# Patient Record
Sex: MALE | Race: WHITE | NOT HISPANIC OR LATINO | Employment: UNEMPLOYED | URBAN - METROPOLITAN AREA
[De-identification: names, ages, dates, MRNs, and addresses within clinical notes are randomized per-mention and may not be internally consistent; named-entity substitution may affect disease eponyms.]

---

## 2017-06-06 ENCOUNTER — TRANSCRIBE ORDERS (OUTPATIENT)
Dept: PHYSICAL THERAPY | Facility: CLINIC | Age: 47
End: 2017-06-06

## 2017-06-06 DIAGNOSIS — S76.012A STRAIN OF LEFT BUTTOCK, INITIAL ENCOUNTER: Primary | ICD-10-CM

## 2018-06-15 ENCOUNTER — HOSPITAL ENCOUNTER (OUTPATIENT)
Facility: HOSPITAL | Age: 48
Setting detail: OBSERVATION
LOS: 1 days | Discharge: HOME | End: 2018-06-16
Attending: EMERGENCY MEDICINE | Admitting: SURGERY

## 2018-06-15 ENCOUNTER — APPOINTMENT (OUTPATIENT)
Dept: GENERAL RADIOLOGY | Facility: HOSPITAL | Age: 48
End: 2018-06-15

## 2018-06-15 ENCOUNTER — APPOINTMENT (OUTPATIENT)
Dept: CT IMAGING | Facility: HOSPITAL | Age: 48
End: 2018-06-15

## 2018-06-15 DIAGNOSIS — K80.00 ACUTE CHOLECYSTITIS DUE TO BILIARY CALCULUS: Primary | ICD-10-CM

## 2018-06-15 DIAGNOSIS — K81.9 CHOLECYSTITIS: ICD-10-CM

## 2018-06-15 LAB
ALBUMIN SERPL-MCNC: 4.2 G/DL (ref 3.5–5.2)
ALBUMIN/GLOB SERPL: 1.3 G/DL
ALP SERPL-CCNC: 101 U/L (ref 39–117)
ALT SERPL W P-5'-P-CCNC: 35 U/L (ref 1–41)
ANION GAP SERPL CALCULATED.3IONS-SCNC: 16.5 MMOL/L
AST SERPL-CCNC: 26 U/L (ref 1–40)
BASOPHILS # BLD AUTO: 0.05 10*3/MM3 (ref 0–0.2)
BASOPHILS NFR BLD AUTO: 0.4 % (ref 0–1.5)
BILIRUB SERPL-MCNC: 0.2 MG/DL (ref 0.1–1.2)
BUN BLD-MCNC: 15 MG/DL (ref 6–20)
BUN/CREAT SERPL: 14.4 (ref 7–25)
CALCIUM SPEC-SCNC: 9.5 MG/DL (ref 8.6–10.5)
CHLORIDE SERPL-SCNC: 103 MMOL/L (ref 98–107)
CO2 SERPL-SCNC: 21.5 MMOL/L (ref 22–29)
CREAT BLD-MCNC: 1.04 MG/DL (ref 0.76–1.27)
DEPRECATED RDW RBC AUTO: 44.8 FL (ref 37–54)
EOSINOPHIL # BLD AUTO: 0.34 10*3/MM3 (ref 0–0.7)
EOSINOPHIL NFR BLD AUTO: 3 % (ref 0.3–6.2)
ERYTHROCYTE [DISTWIDTH] IN BLOOD BY AUTOMATED COUNT: 12.8 % (ref 11.5–14.5)
GFR SERPL CREATININE-BSD FRML MDRD: 77 ML/MIN/1.73
GLOBULIN UR ELPH-MCNC: 3.2 GM/DL
GLUCOSE BLD-MCNC: 135 MG/DL (ref 65–99)
HCT VFR BLD AUTO: 45.5 % (ref 40.4–52.2)
HGB BLD-MCNC: 15.5 G/DL (ref 13.7–17.6)
IMM GRANULOCYTES # BLD: 0.03 10*3/MM3 (ref 0–0.03)
IMM GRANULOCYTES NFR BLD: 0.3 % (ref 0–0.5)
LYMPHOCYTES # BLD AUTO: 2.59 10*3/MM3 (ref 0.9–4.8)
LYMPHOCYTES NFR BLD AUTO: 23 % (ref 19.6–45.3)
MCH RBC QN AUTO: 32.7 PG (ref 27–32.7)
MCHC RBC AUTO-ENTMCNC: 34.1 G/DL (ref 32.6–36.4)
MCV RBC AUTO: 96 FL (ref 79.8–96.2)
MONOCYTES # BLD AUTO: 0.93 10*3/MM3 (ref 0.2–1.2)
MONOCYTES NFR BLD AUTO: 8.2 % (ref 5–12)
NEUTROPHILS # BLD AUTO: 7.34 10*3/MM3 (ref 1.9–8.1)
NEUTROPHILS NFR BLD AUTO: 65.1 % (ref 42.7–76)
PLATELET # BLD AUTO: 265 10*3/MM3 (ref 140–500)
PMV BLD AUTO: 10.2 FL (ref 6–12)
POTASSIUM BLD-SCNC: 3.9 MMOL/L (ref 3.5–5.2)
PROT SERPL-MCNC: 7.4 G/DL (ref 6–8.5)
RBC # BLD AUTO: 4.74 10*6/MM3 (ref 4.6–6)
SODIUM BLD-SCNC: 141 MMOL/L (ref 136–145)
TROPONIN T SERPL-MCNC: <0.01 NG/ML (ref 0–0.03)
WBC NRBC COR # BLD: 11.28 10*3/MM3 (ref 4.5–10.7)

## 2018-06-15 PROCEDURE — 25010000002 HYDROMORPHONE PER 4 MG: Performed by: EMERGENCY MEDICINE

## 2018-06-15 PROCEDURE — G0378 HOSPITAL OBSERVATION PER HR: HCPCS

## 2018-06-15 PROCEDURE — 93005 ELECTROCARDIOGRAM TRACING: CPT | Performed by: EMERGENCY MEDICINE

## 2018-06-15 PROCEDURE — 80053 COMPREHEN METABOLIC PANEL: CPT | Performed by: EMERGENCY MEDICINE

## 2018-06-15 PROCEDURE — 93005 ELECTROCARDIOGRAM TRACING: CPT

## 2018-06-15 PROCEDURE — 85025 COMPLETE CBC W/AUTO DIFF WBC: CPT | Performed by: EMERGENCY MEDICINE

## 2018-06-15 PROCEDURE — 96374 THER/PROPH/DIAG INJ IV PUSH: CPT

## 2018-06-15 PROCEDURE — 99284 EMERGENCY DEPT VISIT MOD MDM: CPT

## 2018-06-15 PROCEDURE — 84484 ASSAY OF TROPONIN QUANT: CPT | Performed by: EMERGENCY MEDICINE

## 2018-06-15 PROCEDURE — 96376 TX/PRO/DX INJ SAME DRUG ADON: CPT

## 2018-06-15 PROCEDURE — 25010000002 MORPHINE PER 10 MG: Performed by: EMERGENCY MEDICINE

## 2018-06-15 PROCEDURE — 96375 TX/PRO/DX INJ NEW DRUG ADDON: CPT

## 2018-06-15 PROCEDURE — 93010 ELECTROCARDIOGRAM REPORT: CPT | Performed by: INTERNAL MEDICINE

## 2018-06-15 PROCEDURE — 25010000002 ONDANSETRON PER 1 MG: Performed by: EMERGENCY MEDICINE

## 2018-06-15 PROCEDURE — 71275 CT ANGIOGRAPHY CHEST: CPT

## 2018-06-15 PROCEDURE — 83690 ASSAY OF LIPASE: CPT | Performed by: PHYSICIAN ASSISTANT

## 2018-06-15 PROCEDURE — 71046 X-RAY EXAM CHEST 2 VIEWS: CPT

## 2018-06-15 RX ORDER — ONDANSETRON 2 MG/ML
4 INJECTION INTRAMUSCULAR; INTRAVENOUS ONCE
Status: COMPLETED | OUTPATIENT
Start: 2018-06-15 | End: 2018-06-15

## 2018-06-15 RX ORDER — SODIUM CHLORIDE 0.9 % (FLUSH) 0.9 %
10 SYRINGE (ML) INJECTION AS NEEDED
Status: DISCONTINUED | OUTPATIENT
Start: 2018-06-15 | End: 2018-06-16 | Stop reason: HOSPADM

## 2018-06-15 RX ORDER — NITROGLYCERIN 0.4 MG/1
0.4 TABLET SUBLINGUAL
Status: DISCONTINUED | OUTPATIENT
Start: 2018-06-15 | End: 2018-06-16 | Stop reason: HOSPADM

## 2018-06-15 RX ORDER — ALUMINA, MAGNESIA, AND SIMETHICONE 2400; 2400; 240 MG/30ML; MG/30ML; MG/30ML
15 SUSPENSION ORAL ONCE
Status: COMPLETED | OUTPATIENT
Start: 2018-06-15 | End: 2018-06-15

## 2018-06-15 RX ORDER — ASPIRIN 325 MG
325 TABLET ORAL ONCE
Status: COMPLETED | OUTPATIENT
Start: 2018-06-15 | End: 2018-06-15

## 2018-06-15 RX ORDER — PAROXETINE 30 MG/1
30 TABLET, FILM COATED ORAL EVERY MORNING
COMMUNITY

## 2018-06-15 RX ADMIN — ASPIRIN 325 MG: 325 TABLET ORAL at 23:12

## 2018-06-15 RX ADMIN — IOPAMIDOL 95 ML: 755 INJECTION, SOLUTION INTRAVENOUS at 23:58

## 2018-06-15 RX ADMIN — ONDANSETRON 4 MG: 2 INJECTION, SOLUTION INTRAMUSCULAR; INTRAVENOUS at 23:08

## 2018-06-15 RX ADMIN — NITROGLYCERIN 0.4 MG: 0.4 TABLET SUBLINGUAL at 23:13

## 2018-06-15 RX ADMIN — LIDOCAINE HYDROCHLORIDE 15 ML: 20 SOLUTION ORAL; TOPICAL at 23:50

## 2018-06-15 RX ADMIN — MORPHINE SULFATE 4 MG: 4 INJECTION INTRAVENOUS at 23:09

## 2018-06-15 RX ADMIN — MORPHINE SULFATE 4 MG: 4 INJECTION INTRAVENOUS at 23:43

## 2018-06-15 RX ADMIN — HYDROMORPHONE HYDROCHLORIDE 1 MG: 1 INJECTION, SOLUTION INTRAMUSCULAR; INTRAVENOUS; SUBCUTANEOUS at 23:51

## 2018-06-15 RX ADMIN — ALUMINUM HYDROXIDE, MAGNESIUM HYDROXIDE, AND DIMETHICONE 15 ML: 400; 400; 40 SUSPENSION ORAL at 23:51

## 2018-06-16 ENCOUNTER — APPOINTMENT (OUTPATIENT)
Dept: CT IMAGING | Facility: HOSPITAL | Age: 48
End: 2018-06-16

## 2018-06-16 ENCOUNTER — ANESTHESIA (OUTPATIENT)
Dept: PERIOP | Facility: HOSPITAL | Age: 48
End: 2018-06-16

## 2018-06-16 ENCOUNTER — ANESTHESIA EVENT (OUTPATIENT)
Dept: PERIOP | Facility: HOSPITAL | Age: 48
End: 2018-06-16

## 2018-06-16 VITALS
RESPIRATION RATE: 16 BRPM | WEIGHT: 260.3 LBS | TEMPERATURE: 98.3 F | HEIGHT: 71 IN | SYSTOLIC BLOOD PRESSURE: 119 MMHG | BODY MASS INDEX: 36.44 KG/M2 | OXYGEN SATURATION: 94 % | HEART RATE: 81 BPM | DIASTOLIC BLOOD PRESSURE: 72 MMHG

## 2018-06-16 PROBLEM — K80.00 ACUTE CHOLECYSTITIS DUE TO BILIARY CALCULUS: Status: RESOLVED | Noted: 2018-06-16 | Resolved: 2018-06-16

## 2018-06-16 PROBLEM — K80.00 ACUTE CHOLECYSTITIS DUE TO BILIARY CALCULUS: Status: ACTIVE | Noted: 2018-06-16

## 2018-06-16 LAB
HOLD SPECIMEN: NORMAL
HOLD SPECIMEN: NORMAL
LIPASE SERPL-CCNC: 31 U/L (ref 13–60)
WHOLE BLOOD HOLD SPECIMEN: NORMAL
WHOLE BLOOD HOLD SPECIMEN: NORMAL

## 2018-06-16 PROCEDURE — G0378 HOSPITAL OBSERVATION PER HR: HCPCS

## 2018-06-16 PROCEDURE — 0 IOPAMIDOL PER 1 ML: Performed by: EMERGENCY MEDICINE

## 2018-06-16 PROCEDURE — 25010000002 FENTANYL CITRATE (PF) 100 MCG/2ML SOLUTION: Performed by: NURSE ANESTHETIST, CERTIFIED REGISTERED

## 2018-06-16 PROCEDURE — 25010000002 METOCLOPRAMIDE PER 10 MG: Performed by: SURGERY

## 2018-06-16 PROCEDURE — 88304 TISSUE EXAM BY PATHOLOGIST: CPT | Performed by: SURGERY

## 2018-06-16 PROCEDURE — 25010000002 PROPOFOL 10 MG/ML EMULSION: Performed by: NURSE ANESTHETIST, CERTIFIED REGISTERED

## 2018-06-16 PROCEDURE — 25010000002 MIDAZOLAM PER 1 MG: Performed by: ANESTHESIOLOGY

## 2018-06-16 PROCEDURE — 25010000002 METOCLOPRAMIDE PER 10 MG

## 2018-06-16 PROCEDURE — 74176 CT ABD & PELVIS W/O CONTRAST: CPT

## 2018-06-16 PROCEDURE — 25010000002 HYDROMORPHONE PER 4 MG: Performed by: NURSE ANESTHETIST, CERTIFIED REGISTERED

## 2018-06-16 PROCEDURE — 25010000002 ONDANSETRON PER 1 MG: Performed by: NURSE ANESTHETIST, CERTIFIED REGISTERED

## 2018-06-16 PROCEDURE — 25010000002 DEXAMETHASONE PER 1 MG: Performed by: NURSE ANESTHETIST, CERTIFIED REGISTERED

## 2018-06-16 PROCEDURE — 25010000002 KETOROLAC TROMETHAMINE PER 15 MG: Performed by: NURSE ANESTHETIST, CERTIFIED REGISTERED

## 2018-06-16 PROCEDURE — 25010000002 PIPERACILLIN SOD-TAZOBACTAM PER 1 G: Performed by: SURGERY

## 2018-06-16 PROCEDURE — 25010000002 PIPERACILLIN SOD-TAZOBACTAM PER 1 G: Performed by: PHYSICIAN ASSISTANT

## 2018-06-16 RX ORDER — FENTANYL CITRATE 50 UG/ML
50 INJECTION, SOLUTION INTRAMUSCULAR; INTRAVENOUS
Status: DISCONTINUED | OUTPATIENT
Start: 2018-06-16 | End: 2018-06-16 | Stop reason: HOSPADM

## 2018-06-16 RX ORDER — OXYCODONE HYDROCHLORIDE AND ACETAMINOPHEN 5; 325 MG/1; MG/1
1 TABLET ORAL EVERY 4 HOURS PRN
Status: DISCONTINUED | OUTPATIENT
Start: 2018-06-16 | End: 2018-06-16 | Stop reason: HOSPADM

## 2018-06-16 RX ORDER — OXYCODONE HYDROCHLORIDE AND ACETAMINOPHEN 5; 325 MG/1; MG/1
2 TABLET ORAL EVERY 4 HOURS PRN
Status: DISCONTINUED | OUTPATIENT
Start: 2018-06-16 | End: 2018-06-16 | Stop reason: HOSPADM

## 2018-06-16 RX ORDER — ONDANSETRON 2 MG/ML
INJECTION INTRAMUSCULAR; INTRAVENOUS AS NEEDED
Status: DISCONTINUED | OUTPATIENT
Start: 2018-06-16 | End: 2018-06-16 | Stop reason: SURG

## 2018-06-16 RX ORDER — SODIUM CHLORIDE, SODIUM LACTATE, POTASSIUM CHLORIDE, CALCIUM CHLORIDE 600; 310; 30; 20 MG/100ML; MG/100ML; MG/100ML; MG/100ML
9 INJECTION, SOLUTION INTRAVENOUS CONTINUOUS
Status: DISCONTINUED | OUTPATIENT
Start: 2018-06-16 | End: 2018-06-16 | Stop reason: HOSPADM

## 2018-06-16 RX ORDER — FENTANYL CITRATE 50 UG/ML
50 INJECTION, SOLUTION INTRAMUSCULAR; INTRAVENOUS
Status: DISCONTINUED | OUTPATIENT
Start: 2018-06-16 | End: 2018-06-16

## 2018-06-16 RX ORDER — HYDROXYZINE PAMOATE 50 MG/1
50 CAPSULE ORAL EVERY 6 HOURS PRN
Status: DISCONTINUED | OUTPATIENT
Start: 2018-06-16 | End: 2018-06-16 | Stop reason: HOSPADM

## 2018-06-16 RX ORDER — METOCLOPRAMIDE HYDROCHLORIDE 5 MG/ML
INJECTION INTRAMUSCULAR; INTRAVENOUS
Status: COMPLETED
Start: 2018-06-16 | End: 2018-06-16

## 2018-06-16 RX ORDER — DEXAMETHASONE SODIUM PHOSPHATE 10 MG/ML
INJECTION INTRAMUSCULAR; INTRAVENOUS AS NEEDED
Status: DISCONTINUED | OUTPATIENT
Start: 2018-06-16 | End: 2018-06-16 | Stop reason: SURG

## 2018-06-16 RX ORDER — PROMETHAZINE HYDROCHLORIDE 25 MG/ML
12.5 INJECTION, SOLUTION INTRAMUSCULAR; INTRAVENOUS ONCE AS NEEDED
Status: DISCONTINUED | OUTPATIENT
Start: 2018-06-16 | End: 2018-06-16

## 2018-06-16 RX ORDER — FLUMAZENIL 0.1 MG/ML
0.2 INJECTION INTRAVENOUS AS NEEDED
Status: DISCONTINUED | OUTPATIENT
Start: 2018-06-16 | End: 2018-06-16

## 2018-06-16 RX ORDER — BUPIVACAINE HYDROCHLORIDE AND EPINEPHRINE 2.5; 5 MG/ML; UG/ML
INJECTION, SOLUTION INFILTRATION; PERINEURAL AS NEEDED
Status: DISCONTINUED | OUTPATIENT
Start: 2018-06-16 | End: 2018-06-16 | Stop reason: HOSPADM

## 2018-06-16 RX ORDER — FENTANYL CITRATE 50 UG/ML
INJECTION, SOLUTION INTRAMUSCULAR; INTRAVENOUS
Status: COMPLETED
Start: 2018-06-16 | End: 2018-06-16

## 2018-06-16 RX ORDER — ROCURONIUM BROMIDE 10 MG/ML
INJECTION, SOLUTION INTRAVENOUS AS NEEDED
Status: DISCONTINUED | OUTPATIENT
Start: 2018-06-16 | End: 2018-06-16 | Stop reason: SURG

## 2018-06-16 RX ORDER — KETOROLAC TROMETHAMINE 30 MG/ML
INJECTION, SOLUTION INTRAMUSCULAR; INTRAVENOUS AS NEEDED
Status: DISCONTINUED | OUTPATIENT
Start: 2018-06-16 | End: 2018-06-16 | Stop reason: SURG

## 2018-06-16 RX ORDER — EPHEDRINE SULFATE 50 MG/ML
5 INJECTION, SOLUTION INTRAVENOUS ONCE AS NEEDED
Status: DISCONTINUED | OUTPATIENT
Start: 2018-06-16 | End: 2018-06-16

## 2018-06-16 RX ORDER — SODIUM CHLORIDE 0.9 % (FLUSH) 0.9 %
1-10 SYRINGE (ML) INJECTION AS NEEDED
Status: DISCONTINUED | OUTPATIENT
Start: 2018-06-16 | End: 2018-06-16 | Stop reason: HOSPADM

## 2018-06-16 RX ORDER — LIDOCAINE HYDROCHLORIDE 20 MG/ML
INJECTION, SOLUTION INFILTRATION; PERINEURAL AS NEEDED
Status: DISCONTINUED | OUTPATIENT
Start: 2018-06-16 | End: 2018-06-16 | Stop reason: SURG

## 2018-06-16 RX ORDER — ONDANSETRON 2 MG/ML
4 INJECTION INTRAMUSCULAR; INTRAVENOUS ONCE AS NEEDED
Status: DISCONTINUED | OUTPATIENT
Start: 2018-06-16 | End: 2018-06-16

## 2018-06-16 RX ORDER — OXYCODONE HYDROCHLORIDE AND ACETAMINOPHEN 5; 325 MG/1; MG/1
1-2 TABLET ORAL EVERY 4 HOURS PRN
Qty: 30 TABLET | Refills: 0 | Status: SHIPPED | OUTPATIENT
Start: 2018-06-16

## 2018-06-16 RX ORDER — MIDAZOLAM HYDROCHLORIDE 1 MG/ML
1 INJECTION INTRAMUSCULAR; INTRAVENOUS
Status: DISCONTINUED | OUTPATIENT
Start: 2018-06-16 | End: 2018-06-16 | Stop reason: HOSPADM

## 2018-06-16 RX ORDER — OXYCODONE HYDROCHLORIDE AND ACETAMINOPHEN 5; 325 MG/1; MG/1
2 TABLET ORAL ONCE AS NEEDED
Status: DISCONTINUED | OUTPATIENT
Start: 2018-06-16 | End: 2018-06-16 | Stop reason: HOSPADM

## 2018-06-16 RX ORDER — LABETALOL HYDROCHLORIDE 5 MG/ML
5 INJECTION, SOLUTION INTRAVENOUS
Status: DISCONTINUED | OUTPATIENT
Start: 2018-06-16 | End: 2018-06-16

## 2018-06-16 RX ORDER — FAMOTIDINE 10 MG/ML
20 INJECTION, SOLUTION INTRAVENOUS ONCE
Status: COMPLETED | OUTPATIENT
Start: 2018-06-16 | End: 2018-06-16

## 2018-06-16 RX ORDER — PAROXETINE 30 MG/1
30 TABLET, FILM COATED ORAL EVERY MORNING
Status: DISCONTINUED | OUTPATIENT
Start: 2018-06-16 | End: 2018-06-16 | Stop reason: HOSPADM

## 2018-06-16 RX ORDER — NALOXONE HCL 0.4 MG/ML
0.2 VIAL (ML) INJECTION AS NEEDED
Status: DISCONTINUED | OUTPATIENT
Start: 2018-06-16 | End: 2018-06-16

## 2018-06-16 RX ORDER — PROPOFOL 10 MG/ML
VIAL (ML) INTRAVENOUS AS NEEDED
Status: DISCONTINUED | OUTPATIENT
Start: 2018-06-16 | End: 2018-06-16 | Stop reason: SURG

## 2018-06-16 RX ORDER — PROMETHAZINE HYDROCHLORIDE 25 MG/1
12.5 TABLET ORAL ONCE AS NEEDED
Status: DISCONTINUED | OUTPATIENT
Start: 2018-06-16 | End: 2018-06-16

## 2018-06-16 RX ORDER — SODIUM CHLORIDE, SODIUM LACTATE, POTASSIUM CHLORIDE, CALCIUM CHLORIDE 600; 310; 30; 20 MG/100ML; MG/100ML; MG/100ML; MG/100ML
150 INJECTION, SOLUTION INTRAVENOUS CONTINUOUS
Status: DISCONTINUED | OUTPATIENT
Start: 2018-06-16 | End: 2018-06-16

## 2018-06-16 RX ORDER — FENTANYL CITRATE 50 UG/ML
INJECTION, SOLUTION INTRAMUSCULAR; INTRAVENOUS AS NEEDED
Status: DISCONTINUED | OUTPATIENT
Start: 2018-06-16 | End: 2018-06-16 | Stop reason: SURG

## 2018-06-16 RX ORDER — LIDOCAINE HYDROCHLORIDE 10 MG/ML
0.5 INJECTION, SOLUTION EPIDURAL; INFILTRATION; INTRACAUDAL; PERINEURAL ONCE AS NEEDED
Status: DISCONTINUED | OUTPATIENT
Start: 2018-06-16 | End: 2018-06-16 | Stop reason: HOSPADM

## 2018-06-16 RX ORDER — PROMETHAZINE HYDROCHLORIDE 25 MG/1
25 TABLET ORAL ONCE AS NEEDED
Status: DISCONTINUED | OUTPATIENT
Start: 2018-06-16 | End: 2018-06-16

## 2018-06-16 RX ORDER — PROMETHAZINE HYDROCHLORIDE 25 MG/1
25 SUPPOSITORY RECTAL ONCE AS NEEDED
Status: DISCONTINUED | OUTPATIENT
Start: 2018-06-16 | End: 2018-06-16

## 2018-06-16 RX ORDER — SODIUM CHLORIDE, SODIUM LACTATE, POTASSIUM CHLORIDE, CALCIUM CHLORIDE 600; 310; 30; 20 MG/100ML; MG/100ML; MG/100ML; MG/100ML
75 INJECTION, SOLUTION INTRAVENOUS CONTINUOUS
Status: DISCONTINUED | OUTPATIENT
Start: 2018-06-16 | End: 2018-06-16 | Stop reason: HOSPADM

## 2018-06-16 RX ORDER — MIDAZOLAM HYDROCHLORIDE 1 MG/ML
2 INJECTION INTRAMUSCULAR; INTRAVENOUS
Status: DISCONTINUED | OUTPATIENT
Start: 2018-06-16 | End: 2018-06-16 | Stop reason: HOSPADM

## 2018-06-16 RX ORDER — DIPHENHYDRAMINE HYDROCHLORIDE 50 MG/ML
12.5 INJECTION INTRAMUSCULAR; INTRAVENOUS
Status: DISCONTINUED | OUTPATIENT
Start: 2018-06-16 | End: 2018-06-16

## 2018-06-16 RX ORDER — METOCLOPRAMIDE HYDROCHLORIDE 5 MG/ML
10 INJECTION INTRAMUSCULAR; INTRAVENOUS ONCE
Status: COMPLETED | OUTPATIENT
Start: 2018-06-16 | End: 2018-06-16

## 2018-06-16 RX ORDER — SODIUM CHLORIDE 9 MG/ML
INJECTION, SOLUTION INTRAVENOUS AS NEEDED
Status: DISCONTINUED | OUTPATIENT
Start: 2018-06-16 | End: 2018-06-16 | Stop reason: HOSPADM

## 2018-06-16 RX ORDER — PROMETHAZINE HYDROCHLORIDE 25 MG/ML
12.5 INJECTION, SOLUTION INTRAMUSCULAR; INTRAVENOUS EVERY 4 HOURS PRN
Status: DISCONTINUED | OUTPATIENT
Start: 2018-06-16 | End: 2018-06-16 | Stop reason: HOSPADM

## 2018-06-16 RX ADMIN — TAZOBACTAM SODIUM AND PIPERACILLIN SODIUM 4.5 G: 500; 4 INJECTION, SOLUTION INTRAVENOUS at 02:26

## 2018-06-16 RX ADMIN — LIDOCAINE HYDROCHLORIDE 50 MG: 20 INJECTION, SOLUTION INFILTRATION; PERINEURAL at 07:39

## 2018-06-16 RX ADMIN — KETOROLAC TROMETHAMINE 30 MG: 30 INJECTION, SOLUTION INTRAMUSCULAR; INTRAVENOUS at 07:51

## 2018-06-16 RX ADMIN — FENTANYL CITRATE 100 MCG: 50 INJECTION INTRAMUSCULAR; INTRAVENOUS at 07:39

## 2018-06-16 RX ADMIN — FENTANYL CITRATE 50 MCG: 50 INJECTION INTRAMUSCULAR; INTRAVENOUS at 08:04

## 2018-06-16 RX ADMIN — DEXAMETHASONE SODIUM PHOSPHATE 8 MG: 10 INJECTION INTRAMUSCULAR; INTRAVENOUS at 07:51

## 2018-06-16 RX ADMIN — SODIUM CHLORIDE, POTASSIUM CHLORIDE, SODIUM LACTATE AND CALCIUM CHLORIDE 9 ML/HR: 600; 310; 30; 20 INJECTION, SOLUTION INTRAVENOUS at 07:18

## 2018-06-16 RX ADMIN — ROCURONIUM BROMIDE 40 MG: 10 INJECTION INTRAVENOUS at 07:39

## 2018-06-16 RX ADMIN — HYDROMORPHONE HYDROCHLORIDE 0.5 MG: 10 INJECTION, SOLUTION INTRAMUSCULAR; INTRAVENOUS; SUBCUTANEOUS at 09:04

## 2018-06-16 RX ADMIN — MIDAZOLAM 2 MG: 1 INJECTION INTRAMUSCULAR; INTRAVENOUS at 07:19

## 2018-06-16 RX ADMIN — SODIUM CHLORIDE, POTASSIUM CHLORIDE, SODIUM LACTATE AND CALCIUM CHLORIDE 150 ML/HR: 600; 310; 30; 20 INJECTION, SOLUTION INTRAVENOUS at 09:26

## 2018-06-16 RX ADMIN — OXYCODONE HYDROCHLORIDE AND ACETAMINOPHEN 2 TABLET: 5; 325 TABLET ORAL at 09:03

## 2018-06-16 RX ADMIN — HYDROMORPHONE HYDROCHLORIDE 0.5 MG: 10 INJECTION, SOLUTION INTRAMUSCULAR; INTRAVENOUS; SUBCUTANEOUS at 09:30

## 2018-06-16 RX ADMIN — METOCLOPRAMIDE 10 MG: 5 INJECTION, SOLUTION INTRAMUSCULAR; INTRAVENOUS at 09:02

## 2018-06-16 RX ADMIN — SODIUM CHLORIDE, POTASSIUM CHLORIDE, SODIUM LACTATE AND CALCIUM CHLORIDE: 600; 310; 30; 20 INJECTION, SOLUTION INTRAVENOUS at 08:34

## 2018-06-16 RX ADMIN — SODIUM CHLORIDE, POTASSIUM CHLORIDE, SODIUM LACTATE AND CALCIUM CHLORIDE 150 ML/HR: 600; 310; 30; 20 INJECTION, SOLUTION INTRAVENOUS at 03:26

## 2018-06-16 RX ADMIN — PROPOFOL 200 MG: 10 INJECTION, EMULSION INTRAVENOUS at 07:39

## 2018-06-16 RX ADMIN — METOCLOPRAMIDE 10 MG: 5 INJECTION, SOLUTION INTRAMUSCULAR; INTRAVENOUS at 10:52

## 2018-06-16 RX ADMIN — ONDANSETRON 4 MG: 2 INJECTION INTRAMUSCULAR; INTRAVENOUS at 07:51

## 2018-06-16 RX ADMIN — SUGAMMADEX 250 MG: 100 INJECTION, SOLUTION INTRAVENOUS at 08:19

## 2018-06-16 RX ADMIN — TAZOBACTAM SODIUM AND PIPERACILLIN SODIUM 4.5 G: 500; 4 INJECTION, SOLUTION INTRAVENOUS at 09:16

## 2018-06-16 RX ADMIN — FAMOTIDINE 20 MG: 10 INJECTION, SOLUTION INTRAVENOUS at 07:19

## 2018-06-16 RX ADMIN — ROCURONIUM BROMIDE 10 MG: 10 INJECTION INTRAVENOUS at 08:05

## 2018-06-16 NOTE — ED PROVIDER NOTES
Pt is a 48 y.o. male who presents to the ED complaining of constant lower central chest pain that started yesterday evening. Pt reports his pain was onset at rest. Pt denies any exacerbating or alleviating factors.       On exam,  Constitutional: NAD  Cardiovascular: HRRR  Pulmonary: CTAB  Abdomen: RUQ abd pain    Labs and imaging reviewed.     0142 - Rechecked pt. Informed pt of the results of his CT abd/pelvis which shows acute cholecystitis. D/w pt the plans for admission for surgery. Pt understands and agrees with plan. All questions answered.     0202 - Discussed pt care with Dr. Rao (General surgery) who agrees to admit the pt for surgery.     Plan: Admission for surgery      MD ATTESTATION NOTE    The EDWIN and I have discussed this patient's history, physical exam, and treatment plan.  I have reviewed the documentation and personally had a face to face interaction with the patient. I affirm the documentation and agree with the treatment and plan.  The attached note describes my personal findings.      Documentation assistance provided by birdie Simmons for Dr. Vargas. Information recorded by the scribe was done at my direction and has been verified and validated by me.            Abel Simmons  06/16/18 0202       Terence Vargas MD  06/16/18 8594

## 2018-06-16 NOTE — ED PROVIDER NOTES
EMERGENCY DEPARTMENT ENCOUNTER    CHIEF COMPLAINT  Chief Complaint: CP  History given by: Patient  History limited by: Nothing  Room Number: P385/1  PMD: KARMEN Cunha      HPI:  Pt is a 48 y.o. male who presents complaining of midsternal CP that began about 4 hours ago. The pt states he was driving when he began to experience the pain. The pt states the pain radiates to his abd. The pt describes the pain as sharp. Pt reports nausea, diaphoresis, and SOA.  He denies fever, chills.    Duration: 4 hours  Onset: Sudden  Timing: Constant  Location: Midsternal  Radiation: Abd  Quality: Sharp  Intensity/Severity: Moderate  Progression: Unchanged  Associated Symptoms: Nausea, diaphoresis, and SOA  Aggravating Factors: None stated  Alleviating Factors: None stated  Previous Episodes: None  Treatment before arrival: Nothing    PAST MEDICAL HISTORY  Active Ambulatory Problems     Diagnosis Date Noted   • No Active Ambulatory Problems     Resolved Ambulatory Problems     Diagnosis Date Noted   • No Resolved Ambulatory Problems     Past Medical History:   Diagnosis Date   • Anxiety    • Hyperlipidemia    • Hypertension    • Panic attack        PAST SURGICAL HISTORY  Past Surgical History:   Procedure Laterality Date   • COLON SURGERY     • FRACTURE SURGERY     • ORTHOPEDIC SURGERY     • TONSILLECTOMY         FAMILY HISTORY  History reviewed. No pertinent family history.    SOCIAL HISTORY  Social History     Social History   • Marital status:      Spouse name: N/A   • Number of children: N/A   • Years of education: N/A     Occupational History   • Not on file.     Social History Main Topics   • Smoking status: Heavy Tobacco Smoker     Packs/day: 1.50     Years: 20.00   • Smokeless tobacco: Not on file      Comment: does not want counseling   • Alcohol use No   • Drug use: No   • Sexual activity: Not on file     Other Topics Concern   • Not on file     Social History Narrative   • No narrative on file        ALLERGIES  Lortab [hydrocodone-acetaminophen]    REVIEW OF SYSTEMS  Review of Systems   Constitutional: Positive for diaphoresis. Negative for activity change, appetite change and fever.   HENT: Negative for congestion and sore throat.    Eyes: Negative.    Respiratory: Positive for shortness of breath. Negative for cough.    Cardiovascular: Positive for chest pain. Negative for leg swelling.   Gastrointestinal: Positive for nausea. Negative for abdominal pain, diarrhea and vomiting.   Endocrine: Negative.    Genitourinary: Negative for decreased urine volume and dysuria.   Musculoskeletal: Negative for neck pain.   Skin: Negative for rash and wound.   Allergic/Immunologic: Negative.    Neurological: Negative for weakness, numbness and headaches.   Hematological: Negative.    Psychiatric/Behavioral: Negative.    All other systems reviewed and are negative.      PHYSICAL EXAM  ED Triage Vitals   Temp Heart Rate Resp BP SpO2   06/15/18 2209 06/15/18 2202 06/15/18 2202 06/15/18 2208 06/15/18 2202   97.8 °F (36.6 °C) 96 22 (!) 147/109 100 %      Temp src Heart Rate Source Patient Position BP Location FiO2 (%)   06/15/18 2209 06/15/18 2202 06/15/18 2208 06/15/18 2208 --   Oral Monitor Sitting Right arm        Physical Exam   Constitutional: He is oriented to person, place, and time. He appears distressed (Moderate).   HENT:   Head: Normocephalic and atraumatic.   Eyes: EOM are normal. Pupils are equal, round, and reactive to light.   Neck: Normal range of motion. Neck supple.   Cardiovascular: Normal rate, regular rhythm, normal heart sounds and intact distal pulses.    Pulmonary/Chest: Effort normal and breath sounds normal. No respiratory distress.   Abdominal: Soft. There is tenderness (Questionable) in the epigastric area. There is no rebound and no guarding.   Musculoskeletal: Normal range of motion. He exhibits no edema.   Neurological: He is alert and oriented to person, place, and time. He has normal  sensation and normal strength.   Skin: Skin is warm and dry.   Psychiatric: Affect normal.   Nursing note and vitals reviewed.      LAB RESULTS  Lab Results (last 24 hours)     Procedure Component Value Units Date/Time    CBC & Differential [[id number]] Collected:  06/15/18 2253    Specimen:  Blood Updated:  06/15/18 2309    Narrative:       The following orders were created for panel order CBC & Differential.  Procedure                               Abnormality         Status                     ---------                               -----------         ------                     CBC Auto Differential[[id number]]        Abnormal            Final result                 Please view results for these tests on the individual orders.    Comprehensive Metabolic Panel [[id number]]  (Abnormal) Collected:  06/15/18 2253    Specimen:  Blood Updated:  06/15/18 2327     Glucose 135 (H) mg/dL      BUN 15 mg/dL      Creatinine 1.04 mg/dL      Sodium 141 mmol/L      Potassium 3.9 mmol/L      Chloride 103 mmol/L      CO2 21.5 (L) mmol/L      Calcium 9.5 mg/dL      Total Protein 7.4 g/dL      Albumin 4.20 g/dL      ALT (SGPT) 35 U/L      AST (SGOT) 26 U/L      Alkaline Phosphatase 101 U/L      Total Bilirubin 0.2 mg/dL      eGFR Non African Amer 77 mL/min/1.73      Globulin 3.2 gm/dL      A/G Ratio 1.3 g/dL      BUN/Creatinine Ratio 14.4     Anion Gap 16.5 mmol/L     Troponin [[id number]]  (Normal) Collected:  06/15/18 2253    Specimen:  Blood Updated:  06/15/18 2327     Troponin T <0.010 ng/mL     Narrative:       Troponin T Reference Ranges:  Less than 0.03 ng/mL:    Negative for AMI  0.03 to 0.09 ng/mL:      Indeterminant for AMI  Greater than 0.09 ng/mL: Positive for AMI    CBC Auto Differential [[id number]]  (Abnormal) Collected:  06/15/18 2253    Specimen:  Blood Updated:  06/15/18 2309     WBC 11.28 (H) 10*3/mm3      RBC 4.74 10*6/mm3      Hemoglobin 15.5 g/dL      Hematocrit 45.5 %      MCV 96.0 fL      MCH 32.7 pg      MCHC 34.1  g/dL      RDW 12.8 %      RDW-SD 44.8 fl      MPV 10.2 fL      Platelets 265 10*3/mm3      Neutrophil % 65.1 %      Lymphocyte % 23.0 %      Monocyte % 8.2 %      Eosinophil % 3.0 %      Basophil % 0.4 %      Immature Grans % 0.3 %      Neutrophils, Absolute 7.34 10*3/mm3      Lymphocytes, Absolute 2.59 10*3/mm3      Monocytes, Absolute 0.93 10*3/mm3      Eosinophils, Absolute 0.34 10*3/mm3      Basophils, Absolute 0.05 10*3/mm3      Immature Grans, Absolute 0.03 10*3/mm3     Lipase [[id number]]  (Normal) Collected:  06/15/18 2253    Specimen:  Blood Updated:  06/16/18 0120     Lipase 31 U/L           I ordered the above labs and reviewed the results    RADIOLOGY  CT Abdomen Pelvis Without Contrast   Final Result   1.  Extensive fatty liver.   2. Solitary gallstone with some subtle pericholecystic inflammation.   Consider sonography if there is concern for acute cholecystitis.           This study was performed with techniques to keep radiation doses as low   as reasonably achievable (ALARA). Individualized dose reduction   techniques using automated exposure control or adjustment of mA and/or   kV according to the patient size were employed.        This report was finalized on 6/16/2018 1:29 AM by Minh Ibanez M.D.          CT Angiogram Chest With Contrast   Final Result   1. No active disease or focal aortic abnormality.   2. Diffuse fatty liver.   3. Uncomplicated cholelithiasis.       This report was finalized on 6/16/2018 12:23 AM by Minh Ibanez M.D.          XR Chest 2 View   Final Result   1. No active disease.       This report was finalized on 6/15/2018 10:33 PM by Minh Ibanez M.D.               I ordered the above noted radiological studies. Interpreted by radiologist. Reviewed by me in PACS.       PROCEDURES  Procedures    EKG           EKG time: 2203  Rhythm/Rate: 96 Normal Sinus  P waves and WA: Normal  QRS, axis: Normal   ST and T waves: Nonspecific T wave changes in the inferior leads      Interpreted Contemporaneously by me, independently viewed  No prior for comparison      PROGRESS AND CONSULTS       2200  EKG ordered for further evaluation.    2215  CXR and labs ordered for further evaluation. ASA ordered.    2302  NTG, Morphine, and Zofran ordered for pain and nausea.    2315  Upon initial encounter, discussed the plan to perform a CT Chest to evaluate for aortic dissection. The pt understands and agrees with the plan.    2319  Morphine ordered for pain.    2342  CTA Chest ordered for further evaluation.    2343  Dilaudid ordered for pain.    2345  Maalox and Lidocaine mouth solution ordered for pain.    2352  BP- 150/95 HR- 91 Temp- 97.8 °F (36.6 °C) (Oral) O2 sat- 98%    Rechecked pt, he is continuing to have pain, but he is now receiving the Dilaudid.    0019  Rechecked pt, his pain has improved significantly after receiving the Dilaudid and GI cocktail.    0059  CT Abd ordered for further evaluation.    0140  Call placed to Surgery.    0149  BP- 150/95 HR- 97 Temp- 97.8 °F (36.6 °C) (Oral) O2 sat- 96%    Rechecked pt, he is resting comfortably. Discussed the lab and CT results with the pt including the gallstone with pericholecystic inflammation. Discussed the plan to admit the pt for surgery. The pt understands and agrees with the plan.    0151  Zosyn ordered.    0202  Dr. Vargas discussed the pt's case with Dr. ADALBERTO Ryan who agreed to admit the pt to surgery.    MEDICAL DECISION MAKING  Results were reviewed/discussed with the patient and they were also made aware of online access. Pt also made aware that some labs, such as cultures, will not be resulted during ER visit and follow up with PMD is necessary.     MDM  Number of Diagnoses or Management Options  Acute cholecystitis due to biliary calculus:      Amount and/or Complexity of Data Reviewed  Clinical lab tests: ordered and reviewed (Lipase - Negative  Troponin - Negative)  Tests in the radiology section of CPT®: ordered and  reviewed (CT Abd - 1.  Extensive fatty liver.  2. Solitary gallstone with some subtle pericholecystic inflammation. Consider sonography if there is concern for acute cholecystitis.  CTA Chest - Negative  CXR - Negative  )  Tests in the medicine section of CPT®: reviewed and ordered (Refer to the procedure section of the note for EKG results  )           DIAGNOSIS  Final diagnoses:   Acute cholecystitis due to biliary calculus       DISPOSITION  ADMISSION    Discussed treatment plan and reason for admission with pt/family and admitting physician.  Pt/family voiced understanding of the plan for admission for further testing/treatment as needed.         Latest Documented Vital Signs:  As of 5:25 AM  BP- 124/69 HR- 90 Temp- 98.4 °F (36.9 °C) (Oral) O2 sat- 95%    --  Documentation assistance provided by birdie Zamudio for Colby Aquino.  Information recorded by the scribe was done at my direction and has been verified and validated by me.       Cholo Zamudio  06/16/18 0244       MADDY Burch III  06/16/18 3796

## 2018-06-16 NOTE — ED NOTES
Pt states he was driving when pain began. States pain begin from lower abdomen up to chest area. Pt unable to sit still through assessment. Pt is A&O x4.     Yisel Napier RN  06/15/18 9103

## 2018-06-16 NOTE — ANESTHESIA PREPROCEDURE EVALUATION
Anesthesia Evaluation     NPO Solid Status: > 8 hours             Airway   Mallampati: II  no difficulty expected  Dental      Pulmonary - normal exam   (+) a smoker Current,     ROS comment: Patient counseled to stop smoking.    Cardiovascular - normal exam    (+) hypertension, hyperlipidemia,       Neuro/Psych  (+) psychiatric history Anxiety,     GI/Hepatic/Renal/Endo    (+) obesity,       Musculoskeletal     Abdominal    Substance History      OB/GYN          Other                        Anesthesia Plan    ASA 2     general     Anesthetic plan and risks discussed with patient.

## 2018-06-16 NOTE — ED NOTES
Pt's wife states pt takes something for steve BP, cholesterol and anxiety but is unable to name medications     Yisel Napier RN  06/15/18 8252

## 2018-06-16 NOTE — PLAN OF CARE
Problem: Patient Care Overview  Goal: Plan of Care Review  Outcome: Ongoing (interventions implemented as appropriate)   06/16/18 8207   Coping/Psychosocial   Plan of Care Reviewed With patient   Plan of Care Review   Progress no change   OTHER   Outcome Summary IVF & ABX, NPO for possible surgery in a.m.

## 2018-06-16 NOTE — ED TRIAGE NOTES
Pt presents to ED for CC of midsternal CP beginning bout 3 hours ago while he was driving. Pain described as constant and stabbing. +Nausea Denies cardiac hx. Pt appears tachypnic, diaphoretic, and reports SOA.

## 2018-06-16 NOTE — ANESTHESIA POSTPROCEDURE EVALUATION
"Patient: Clau Justice    Procedure Summary     Date:  06/16/18 Room / Location:  [facility] OR [address] [place] [address] MAIN OR    Anesthesia Start:  0735 Anesthesia Stop:  0848    Procedure:  CHOLECYSTECTOMY LAPAROSCOPIC (N/A Abdomen) Diagnosis:      Surgeon:  Nick Dove MD Provider:  Scottie Downs MD    Anesthesia Type:  general ASA Status:  2          Anesthesia Type: general  Last vitals  BP   140/83 (06/16/18 0845)   Temp   36.6 °C (97.9 °F) (06/16/18 0845)   Pulse   90 (06/16/18 0845)   Resp   16 (06/16/18 0845)     SpO2   93 % (06/16/18 0719)     Post Anesthesia Care and Evaluation    Patient location during evaluation: bedside  Patient participation: complete - patient participated  Level of consciousness: awake  Pain management: adequate  Airway patency: patent  Anesthetic complications: No anesthetic complications    Cardiovascular status: acceptable  Respiratory status: acceptable  Hydration status: acceptable    Comments: /83 (BP Location: Left arm, Patient Position: Lying)   Pulse 90   Temp 36.6 °C (97.9 °F) (Oral)   Resp 16   Ht 180.3 cm (71\")   Wt 118 kg (260 lb 4.8 oz)   SpO2 93%   BMI 36.30 kg/m²         "

## 2018-06-16 NOTE — ANESTHESIA PROCEDURE NOTES
Airway  Urgency: elective    Date/Time: 6/16/2018 7:43 AM  Airway not difficult    General Information and Staff    Patient location during procedure: OR  Anesthesiologist: JANICE PALOMARES  CRNA: ELISSA GHOTRA    Indications and Patient Condition  Indications for airway management: airway protection    Preoxygenated: yes  MILS maintained throughout  Mask difficulty assessment: 1 - vent by mask    Final Airway Details  Final airway type: endotracheal airway      Successful airway: ETT  Cuffed: yes   Successful intubation technique: direct laryngoscopy  Facilitating devices/methods: Bougie  Endotracheal tube insertion site: oral  Blade: Zamudio  Blade size: #2  ETT size: 7.5 mm  Cormack-Lehane Classification: grade IIb - view of arytenoids or posterior of glottis only  Placement verified by: chest auscultation and capnometry   Measured from: lips  ETT to lips (cm): 23  Number of attempts at approach: 1

## 2018-06-16 NOTE — OP NOTE
June 16, 2018    Clau Justice  [id number]    PROCEDURE: Laparoscopic cholecystectomy.    PREOPERATIVE DIAGNOSIS:  Acute cholecystitis..    POSTOPERATIVE DIAGNOSIS: Same.      SURGEON:  Nick Dove M.D.    ASSISTANT:  None.      ANESTHESIA:  Gen. endotracheal with local.      ESTIMATED BLOOD LOSS: 25 cc.    SPECIMENS:    Order Name Source Comment Collection Info Order Time   TISSUE PATHOLOGY EXAM Gallbladder  Collected By: Nick Dove MD 6/16/2018  8:19 AM       INDICATIONS:  Patient is a heavyset 48-year-old young man currently on temporary disability for shoulder injuries who presented to the ER after his daughter's rehearsal dinner with what turned out to be a gallbladder attack.  He's probably had numerous similar attacks in the past but this was by far the worst.  He was evaluated and found on CT scan if acute cholecystitis.  He was admitted and started on antibiotics and symptomatic medication but on the surgery schedule as an urgent add on for laparoscopic cholecystectomy.  The risks, benefits, alternatives and options for therapy were all discussed with him preoperatively including but not limited to the potential for opening and internal organ injury.  He presents now for that surgery.      PROCEDURE:  Patient was brought the operating room and placed supine.  General endotracheal anesthesia was established.  Abdomen was clipped prepped and draped in the normal sterile fashion.  I started by making a 5 mm transverse infraumbilical incision through which a blade was trocar was inserted.  After insufflation to 17 mmHg pressure I was inspected and luckily was able to visualize the anterior superior liver edge and the dome of the gallbladder.  I placed an 11 mm subxiphoid trocar and 25 mm right lateral subcostal trochars under direct vision.  I grabbed the very tip of the gallbladder with the far lateral trocar and a gallbladder grasper and elevated.  Unfortunately I identified very dense  pericholecystic adhesions appearing to consist mainly of dense thick heavy omentum.  The adhesions were so dense I was unable to get any decent plane and bluntly dissected away so I had to use countertraction and cautery to more way down the gallbladder.  The gallbladder was acutely and chronically inflamed and I did get into it once or twice.  I did get down to what I felt was the Gomez's pouch and was able to pull on this to open up the triangle of: Oh.  Below this I was unable to identify any recognizable anatomy.  The scar tissue was worse as I went from top to bottom of the gallbladder and at this point I felt further dissection was unsafe.  I was able to identify by opening the triangle and nice opening at the neck of the gallbladder and dissect this out rather easily and clearly.  I then was able to identify the plane between the cystic artery and duct and I was able to clear the artery clipped and divided it which created a nice wide-open window through the triangle and behind the gallbladder to the other side.  The cystic duct was quite large and probably impacted with stones as was seen on CT scan.  However, I did not fill comfortable going any lower for fear of injuring underlying structures.  It was too big for simple clips so I brought in an endoscopic CESIA stapling device, switch my trocar to a 12 mm and was able to easily insert the stapler through my window that I created an across the breast of the cystic duct and stapled without much difficulty.  This allowed the gallbladder to come up nicely and I was able to actually rather easily removed from the gallbladder fossa with countertraction and cautery without any other suspicious or worrisome findings.  The gallbladder spilled a fair amount of bile so once I had removed I irrigated with complete liter of saline up with the gallbladder and bag and removed it.  No Stones appeared to have spilled.  After irrigation and gallbladder removal I went ahead  and inspected the liver bed and found no significant bleeding or signs of bile leak.  The omental fat that had been dissected away was inspected irrigated and cauterized for hemostasis until it would appear to be fairly dry.  At that point I removed all the interim as trochars and initiated closure.  Local anesthetic was injected.  4-0 Vicryl subcutaneous or closure was performed.  Benzoin Steri-Strips and Band-Aids were applied.  The patient will be sent back to the floor to receive symptomatically medication and further antibiotics.  Unfortunately, today as his daughter's wedding so I will do everything I can to get him out of the hospital and to that event if at all possible understanding that certainly in most situations upright would've kept him overnight another night.    Nick Dove M.D.

## 2018-06-16 NOTE — H&P
June 16, 2018    Clau Justice  [id number]      GENERAL SURGERY HISTORY AND PHYSICAL    ADMITTING PHYSICIAN:  Nick Dove M.D.    PRIMARY PHYSICIAN:   KARMEN Cunha.    DIAGNOSIS:  Acute Cholecystitis, cholelithiasis.    HISTORY:  Clau Justice presents today with a than 1 day history of abdominal pain localizing to the right upper quadrant.  It started yesterday afternoon after his daughter's rehearsal dinner.  The wetting is planned for today.  He's had similar attacks in the past even some that woke him up in the middle the night but they were already short-lived.  This one was by far the most severe and long lasting.  He presented to the ER for further evaluation.  It is not associated with fever and chills but he has had diaphoresis.  It is associated with nausea.  It is associated with anorexia. It is not associated with diarrhea.  There have been no urinary complaints.  There have been no changes in the color of the urine or stool.  He has  had a CT Scan to confirm the diagnosis.  Blood work reveals normal liver enzymes.    Past Medical History:   Diagnosis Date   • Anxiety    • Hyperlipidemia    • Hypertension    • Panic attack      Past Surgical History:   Procedure Laterality Date   • COLON SURGERY     • FRACTURE SURGERY     • ORTHOPEDIC SURGERY     • TONSILLECTOMY       Social History     Social History   • Marital status:      Spouse name: N/A   • Number of children: N/A   • Years of education: N/A     Occupational History   • Not on file.     Social History Main Topics   • Smoking status: Heavy Tobacco Smoker     Packs/day: 1.50     Years: 20.00   • Smokeless tobacco: Not on file      Comment: does not want counseling   • Alcohol use No   • Drug use: No   • Sexual activity: Not on file     Other Topics Concern   • Not on file     Social History Narrative   • No narrative on file     History reviewed. No pertinent family history.    Review of Systems  On questioning, the  "patient denies any weight loss, fatigue or headache, no visual changes or hearing complaints, no new cardiovascular or pulmonary complaints, no  complaints, no musculoskeletal or neurologic complaints, no skin, bleeding, psychiatric or endocrine complaints.      Physical Exam  /87 (BP Location: Left arm, Patient Position: Lying)   Pulse 86   Temp 98.6 °F (37 °C) (Oral)   Resp 16   Ht 180.3 cm (71\")   Wt 118 kg (260 lb 4.8 oz)   SpO2 93%   BMI 36.30 kg/m²     On exam, patient is alert oriented and appropriate and is in no acute distress.  HEENT:   Head is normocephalic, both external ears are normal and sclerae are nonicteric.  Neck:  Supple without lymphadenopathy.  Heart:  Regular without obvious murmur.  Chest:  Good respiratory effort bilaterally.  Abdomen:  Soft, protuberant, obese, tenderness moderate, without guarding, without rebound - epigastric and RUQ, no masses palpated.  Rectal:  Deferred.  Extremities:  Without edema.  Skin:  Negative.    Lab Results   Component Value Date    WBC 11.28 (H) 06/15/2018    HGB 15.5 06/15/2018    HCT 45.5 06/15/2018     06/15/2018     Lab Results   Component Value Date     06/15/2018    K 3.9 06/15/2018     06/15/2018    CO2 21.5 (L) 06/15/2018    BUN 15 06/15/2018    CREATININE 1.04 06/15/2018    GLUCOSE 135 (H) 06/15/2018     Lab Results   Component Value Date    AST 26 06/15/2018    ALT 35 06/15/2018    ALKPHOS 101 06/15/2018     CT scan images and results reviewed.    ASSESSMENT/PLAN:  Clau Justice presents with fairly classic symptomatic biliary colic and documented acute cholecystitis on CT Scan.  I went over everything with him after reviewing his studies.  I recommended consideration for laparoscopic cholecystectomy to be done this admission with hopeful discharge home soon after so we can make it to his daughter's wedding.  The procedure, risks, benefits and alternatives were discussed including but not limited to bleeding, " infection, opening, internal organ injury as well as the need for further surgery acutely or chronically down the line.  He would like to proceed.  I'll get that scheduled.

## 2018-06-18 LAB
CYTO UR: NORMAL
LAB AP CASE REPORT: NORMAL
PATH REPORT.FINAL DX SPEC: NORMAL
PATH REPORT.GROSS SPEC: NORMAL

## 2018-06-19 NOTE — DISCHARGE SUMMARY
"June 16, 2018     Clau Justice  [id number]      ADMITTING DIAGNOSIS:  Acute cholecystitis due to biliary calculus [K80.00]    DISCHARGE DIAGNOSIS:  Same.    ADMITTING MD:  Nick Dove M.D.    CONSULTANTS:  None.    PRIMARY MD:  KARMEN Cunha    PROCEDURES PERFORMED:   Procedure(s):  CHOLECYSTECTOMY LAPAROSCOPIC       HOSPITAL COURSE:  Patient is a 48 y.o. male presented with signs and symptoms of biliary colic and was found on his ER workup to have acute cholecystitis.  He was admitted with plans for surgical intervention.  He was put on the surgery schedule as an add on in the morning and underwent a laparoscopic cholecystectomy.  The surgery confirmed a very inflamed gallbladder and went well but overnight observation after the surgery was recommended.  Unfortunately, his daughter was getting  later that same afternoon so we \"Bent the rules\" a little bit and let him go home with pain medicine and restrictions regarding diet and activity.  He was told to call if he had any setbacks or problems before his planned follow-up in a week and a half or so.    DISPOSITION:  Home.    Discharge Medications     Discharge Medications      New Medications      Instructions Start Date   oxyCODONE-acetaminophen 5-325 MG per tablet  Commonly known as:  PERCOCET   1-2 tablets, Oral, Every 4 Hours PRN         Continue These Medications      Instructions Start Date   hydrOXYzine 50 MG capsule  Commonly known as:  VISTARIL   50 mg, Oral, Every 6 Hours PRN      PARoxetine 30 MG tablet  Commonly known as:  PAXIL   30 mg, Oral, Every Morning               Nick Dove M.D.  06/19/18  12:35 PM    Time: 15 minutes.                                      "

## 2022-04-04 ENCOUNTER — HOSPITAL ENCOUNTER (EMERGENCY)
Facility: HOSPITAL | Age: 52
Discharge: HOME | End: 2022-04-04
Attending: EMERGENCY MEDICINE | Admitting: EMERGENCY MEDICINE

## 2022-04-04 VITALS
HEART RATE: 65 BPM | TEMPERATURE: 98.7 F | OXYGEN SATURATION: 96 % | RESPIRATION RATE: 18 BRPM | SYSTOLIC BLOOD PRESSURE: 142 MMHG | DIASTOLIC BLOOD PRESSURE: 91 MMHG

## 2022-04-04 DIAGNOSIS — H60.391 ACUTE INFECTIVE OTITIS EXTERNA, RIGHT: Primary | ICD-10-CM

## 2022-04-04 DIAGNOSIS — H92.01 RIGHT EAR PAIN: ICD-10-CM

## 2022-04-04 DIAGNOSIS — H66.001 NON-RECURRENT ACUTE SUPPURATIVE OTITIS MEDIA OF RIGHT EAR WITHOUT SPONTANEOUS RUPTURE OF TYMPANIC MEMBRANE: ICD-10-CM

## 2022-04-04 LAB — GLUCOSE BLDC GLUCOMTR-MCNC: 109 MG/DL (ref 70–130)

## 2022-04-04 PROCEDURE — 82962 GLUCOSE BLOOD TEST: CPT

## 2022-04-04 PROCEDURE — 99282 EMERGENCY DEPT VISIT SF MDM: CPT

## 2022-04-04 RX ORDER — AMOXICILLIN AND CLAVULANATE POTASSIUM 875; 125 MG/1; MG/1
1 TABLET, FILM COATED ORAL 2 TIMES DAILY
Qty: 20 TABLET | Refills: 0 | Status: SHIPPED | OUTPATIENT
Start: 2022-04-04

## 2022-04-04 RX ORDER — CIPROFLOXACIN AND DEXAMETHASONE 3; 1 MG/ML; MG/ML
4 SUSPENSION/ DROPS AURICULAR (OTIC) 2 TIMES DAILY
Qty: 7.5 ML | Refills: 0 | Status: SHIPPED | OUTPATIENT
Start: 2022-04-04 | End: 2022-04-11

## 2022-04-05 NOTE — DISCHARGE INSTRUCTIONS
Antibiotics ointment as directed  Oral antibiotics as directed  Home to rest  Drink plenty of fluids  Return for worsening symptoms, fever, vomiting, severe pain or any new problems  Follow up with your doctor this week  Return if worse or new concerns   Continue care with your primary care physician and have your blood pressure regularly checked and managed. Normal blood pressure is 120/80.

## 2022-04-05 NOTE — ED NOTES
Pt to ER via POV from home, pt in mask, staff in PPE. Pt reports right ear pain, drainage and fullness since 3 days ago. Denies any trauma, denies any qtip insertions. No drainage noted in triage. Pt reports some hearing loss in that ear since sx's started. Denies fever.

## 2022-04-05 NOTE — ED PROVIDER NOTES
I supervised care provided by the midlevel provider.   We have discussed this patient's history, physical exam, and treatment plan.  I have reviewed the note and personally saw and examined the patient and agree with the plan of care.   I have seen and examined this gentleman.  He has had for 2 days some pain in his right ear.  He has noticed drainage from his right ear canal.  He denies any fevers or chills.  He is unaware of any trauma or injury.  He denies diabetes or any immunocompromise status.  Physical exam.  Patient's head is normocephalic atraumatic  Pupils are equal round reactive to light and extract muscles are intact.  To his right ear to his external canal he has some drainage and thickened external canal.  His right auricle is unremarkable with no obvious warmth tenderness or pain.  He has no mastoid tenderness.  His external canal is not completely occluded.  Patient appears well and is not septic or toxic appearing.    Heart rate is regular without any tachycardia  Patient is in no respiratory distress with normal respiratory rate  Abdomen is soft and nontender.    I reviewed the patient's vital signs which were unremarkable.  We also checked a Chemstrip and his glucose was 109.  This gentleman has an otitis externa.  I do not suspect he has malignant otitis externa.  We will place him on antibiotics given strict instruction to return if he has worsening of symptoms or any concerns.     Kalpesh Germain MD  04/05/22 1045

## 2022-04-05 NOTE — ED PROVIDER NOTES
EMERGENCY DEPARTMENT ENCOUNTER    Room Number:  B02/02  Date of encounter:  4/4/2022  PCP: System, Provider Not In  Historian: patient   Full history not obtainable due to: none     HPI:  Chief Complaint: Right ear pain      Context: Clau Justice is a 52 y.o. male who presents to the ED c/o right ear pain onset 3 days prior. Pain is constant. Noted to be severe. Nothing seems to make it better. Non radiating. States it is so painful it is keeping him awake at night.     No fever. No vomiting. No fall or trauma.     No hx of DM.     PAST MEDICAL HISTORY    Active Ambulatory Problems     Diagnosis Date Noted   • No Active Ambulatory Problems     Resolved Ambulatory Problems     Diagnosis Date Noted   • Acute cholecystitis due to biliary calculus 06/16/2018     Past Medical History:   Diagnosis Date   • Anxiety    • Hyperlipidemia    • Hypertension    • Panic attack          PAST SURGICAL HISTORY  Past Surgical History:   Procedure Laterality Date   • CHOLECYSTECTOMY N/A 6/16/2018    Procedure: CHOLECYSTECTOMY LAPAROSCOPIC;  Surgeon: Nick Dove MD;  Location: [facility];  Service: General   • COLON SURGERY     • FRACTURE SURGERY     • ORTHOPEDIC SURGERY     • TONSILLECTOMY           FAMILY HISTORY  No family history on file.      SOCIAL HISTORY  Social History     Socioeconomic History   • Marital status:    Tobacco Use   • Smoking status: Heavy Tobacco Smoker     Packs/day: 1.50     Years: 20.00     Pack years: 30.00   • Tobacco comment: does not want counseling   Substance and Sexual Activity   • Alcohol use: No   • Drug use: No         ALLERGIES  Lortab [hydrocodone-acetaminophen]        REVIEW OF SYSTEMS  Review of Systems   All systems reviewed and marked as negative except as listed in HPI       PHYSICAL EXAM    I have reviewed the triage vital signs and nursing notes.    ED Triage Vitals   Temp Heart Rate Resp BP SpO2   04/04/22 2037 04/04/22 2037 04/04/22 2037 04/04/22 2039 04/04/22 2037    98.7 °F (37.1 °C) 114 18 149/91 96 %      Temp src Heart Rate Source Patient Position BP Location FiO2 (%)   04/04/22 2037 04/04/22 2037 04/04/22 2037 04/04/22 2037 --   Tympanic Monitor Standing Right arm        GENERAL: alert well developed, well nourished in no distress. Well appearing.   HENT: NCAT, neck supple, trachea midline. There is moderate amount of purulent drainage arising from the right EAC. I am able to visualize the TM which appears to be in tact but bulging. There is pain with manipulation of the tragus. No tenderness of the mastoid process. OP is clear.   EYES: no scleral icterus, PERRL, normal conjunctivae  CV: regular rhythm, regular rate, no murmur  RESPIRATORY: unlabored effort, CTAB  ABDOMEN: soft, nontender, nondistended, bowel sounds present  MUSCULOSKELETAL: no gross deformity  NEURO: alert,  sensory and motor function of extremities grossly intact, speech clear, mental status normal/baseline  SKIN: warm, dry, no rash  PSYCH:  Appropriate mood and affect    Vital signs and nursing notes reviewed.          LAB RESULTS  Recent Results (from the past 24 hour(s))   POC Glucose Once    Collection Time: 04/04/22 10:30 PM    Specimen: Blood   Result Value Ref Range    Glucose 109 70 - 130 mg/dL       Ordered the above labs and independently reviewed the results.      PROCEDURES    Procedures        MEDICATIONS GIVEN IN ER    Medications - No data to display      PROGRESS, DATA ANALYSIS, CONSULTS, AND MEDICAL DECISION MAKING    All labs have been independently reviewed by me.  All radiology studies have been reviewed by me.   EKG's independently reviewed by me.  Discussion below represents my analysis of pertinent findings related to patient's condition, differential diagnosis, treatment plan and final disposition.    DIFFERENTIAL DIAGNOSIS INCLUDE BUT NOT LIMITED TO: OE, AOM, ruptured TM, mastoiditis, malignant otitis externa     ED Course as of 04/04/22 2304   Mon Apr 04, 2022   2243 No hx of  DM. This is a purulent otitis externa. Checked BG, 109. Unlikely for malignant otitis externa given how well the pt appears and no hx of immunocompromisation or DM. There is no tenderness of the mastoid. No fever. Will plan for discharge with oral and topical abx and recommend close fu with family physician.  [JS]      ED Course User Index  [JS] Teresa Morales APRN       AS OF 23:04 EDT VITALS:        BP - 142/91  HR - 65  TEMP - 98.7 °F (37.1 °C) (Tympanic)  O2 SATS - 96%         Medication List      New Prescriptions    amoxicillin-clavulanate 875-125 MG per tablet  Commonly known as: AUGMENTIN  Take 1 tablet by mouth 2 (Two) Times a Day.     ciprofloxacin-dexamethasone 0.3-0.1 % otic suspension  Commonly known as: CIPRODEX  Administer 4 drops to the right ear 2 (Two) Times a Day for 7 days.           Where to Get Your Medications      You can get these medications from any pharmacy    Bring a paper prescription for each of these medications  · amoxicillin-clavulanate 875-125 MG per tablet  · ciprofloxacin-dexamethasone 0.3-0.1 % otic suspension           DIAGNOSIS  Final diagnoses:   Acute infective otitis externa, right   Right ear pain   Non-recurrent acute suppurative otitis media of right ear without spontaneous rupture of tympanic membrane         DISPOSITION  Discharge     Pt masked in first look. I wore appropriate PPE throughout my encounters with the pt. I performed hand hygiene on entry into the pt room and upon exit.     Dictated utilizing Dragon dictation:  Much of this encounter note is an electronic transcription/translation of spoken language to printed text.      Teresa Morales APRN  04/04/22 1424

## 2022-04-05 NOTE — ED NOTES
Pt c/o right ear pain and drainage x3days. Denies fever.    This RN wore mask and goggles during time of contact

## (undated) DEVICE — LOU LAP CHOLE: Brand: MEDLINE INDUSTRIES, INC.

## (undated) DEVICE — APPL CHLORAPREP W/TINT 26ML ORNG

## (undated) DEVICE — SYR LUERLOK 30CC

## (undated) DEVICE — GLV SURG BIOGEL LTX PF 7

## (undated) DEVICE — DRSNG WND BORDR/ADHS NONADHR/GZ LF 2X2IN STRL

## (undated) DEVICE — ENDOPOUCH RETRIEVER SPECIMEN RETRIEVAL BAGS: Brand: ENDOPOUCH RETRIEVER

## (undated) DEVICE — ENDOPATH XCEL BLADELESS TROCARS WITH STABILITY SLEEVES: Brand: ENDOPATH XCEL

## (undated) DEVICE — 3M™ STERI-STRIP™ COMPOUND BENZOIN TINCTURE 40 BAGS/CARTON 4 CARTONS/CASE C1544: Brand: 3M™ STERI-STRIP™

## (undated) DEVICE — 3M™ STERI-STRIP™ REINFORCED ADHESIVE SKIN CLOSURES, R1547, 1/2 IN X 4 IN (12 MM X 100 MM), 6 STRIPS/ENVELOPE: Brand: 3M™ STERI-STRIP™

## (undated) DEVICE — ENDOPATH ETS-FLEX45 ARTICULATING ENDOSCOPIC LINEAR CUTTER, NO RELOAD: Brand: ENDOPATH

## (undated) DEVICE — ENDOPATH XCEL UNIVERSAL TROCAR STABLILITY SLEEVES: Brand: ENDOPATH XCEL

## (undated) DEVICE — ANTIBACTERIAL UNDYED BRAIDED (POLYGLACTIN 910), SYNTHETIC ABSORBABLE SUTURE: Brand: COATED VICRYL